# Patient Record
Sex: FEMALE | Race: WHITE | URBAN - METROPOLITAN AREA
[De-identification: names, ages, dates, MRNs, and addresses within clinical notes are randomized per-mention and may not be internally consistent; named-entity substitution may affect disease eponyms.]

---

## 2020-09-28 ENCOUNTER — OFFICE VISIT (OUTPATIENT)
Dept: URBAN - METROPOLITAN AREA CLINIC 128 | Facility: CLINIC | Age: 50
End: 2020-09-28
Payer: COMMERCIAL

## 2020-09-28 DIAGNOSIS — Z12.11 COLON CANCER SCREENING: ICD-10-CM

## 2020-09-28 DIAGNOSIS — R12 HEARTBURN: ICD-10-CM

## 2020-09-28 PROCEDURE — G8427 DOCREV CUR MEDS BY ELIG CLIN: HCPCS | Performed by: INTERNAL MEDICINE

## 2020-09-28 PROCEDURE — 99214 OFFICE O/P EST MOD 30 MIN: CPT | Performed by: INTERNAL MEDICINE

## 2020-09-28 PROCEDURE — 1036F TOBACCO NON-USER: CPT | Performed by: INTERNAL MEDICINE

## 2020-09-28 PROCEDURE — 3017F COLORECTAL CA SCREEN DOC REV: CPT | Performed by: INTERNAL MEDICINE

## 2020-09-28 RX ORDER — FAMOTIDINE 20 MG/1
TABLET ORAL
Qty: 0 | Refills: 0 | Status: ACTIVE | COMMUNITY
Start: 1900-01-01 | End: 1900-01-01

## 2020-09-28 RX ORDER — PROMETHAZINE HYDROCHLORIDE 12.5 MG/1
TABLET ORAL
Qty: 0 | Refills: 0 | Status: ACTIVE | COMMUNITY
Start: 1900-01-01 | End: 1900-01-01

## 2020-09-28 RX ORDER — SUCRALFATE 1 G/1
TABLET ORAL
Qty: 0 | Refills: 0 | Status: ACTIVE | COMMUNITY
Start: 1900-01-01 | End: 1900-01-01

## 2020-09-28 RX ORDER — PANTOPRAZOLE SODIUM 20 MG/1
1 TABLET TABLET, DELAYED RELEASE ORAL ONCE A DAY
Qty: 90 TABLET | Refills: 1 | OUTPATIENT
Start: 2020-09-28

## 2020-09-28 RX ORDER — ONDANSETRON 4 MG/1
TABLET, ORALLY DISINTEGRATING ORAL
Qty: 0 | Refills: 0 | Status: ACTIVE | COMMUNITY
Start: 1900-01-01 | End: 1900-01-01

## 2020-09-28 RX ORDER — PROPRANOLOL HYDROCHLORIDE 60 MG/1
CAPSULE, EXTENDED RELEASE ORAL
Qty: 0 | Refills: 0 | Status: ACTIVE | COMMUNITY
Start: 1900-01-01 | End: 1900-01-01

## 2020-09-28 NOTE — HPI-TODAY'S VISIT:
Mrs. Baca is a 50 year old female who was last seen in GI clinic on 5/15/2020 for heartburn.  At time of last clinic visit we prescribed protonix 20mg daily. She reports doing well up until mid September 2020. She reports having such severe reflux she was afraid to lay down as concern she will aspirate. She reports alcohol and sodas are identified triggers.   Mrs. Baca reports she has not yet had EGD performed.   She reports also being due for a screening colonoscopy.   Prior history is summarized below: -She had onset of reflux since childhood. She was taking famotidine 20mg daily and carafate PRN; she reports being on famotidine for almost 2 years. She reports being on protonix for 3 months; she reports it worked well but her provider did not want her to be on it long term.   -She had anterior cervical disc fusion in 2007 of C5/C6.  -She reports having an EGD in 2012 which she reports being told showed "fire engine red blotches" in her stomach.

## 2020-10-20 ENCOUNTER — OFFICE VISIT (OUTPATIENT)
Dept: URBAN - METROPOLITAN AREA LAB 2 | Facility: LAB | Age: 50
End: 2020-10-20
Payer: COMMERCIAL

## 2020-10-20 DIAGNOSIS — K29.60 ADENOPAPILLOMATOSIS GASTRICA: ICD-10-CM

## 2020-10-20 DIAGNOSIS — K63.5 BENIGN COLON POLYP: ICD-10-CM

## 2020-10-20 DIAGNOSIS — Z12.11 COLON CANCER SCREENING: ICD-10-CM

## 2020-10-20 DIAGNOSIS — K20.80 LOS ANGELES GRADE B ESOPHAGITIS: ICD-10-CM

## 2020-10-20 PROCEDURE — 45385 COLONOSCOPY W/LESION REMOVAL: CPT | Performed by: INTERNAL MEDICINE

## 2020-10-20 PROCEDURE — 45380 COLONOSCOPY AND BIOPSY: CPT | Performed by: INTERNAL MEDICINE

## 2020-10-20 PROCEDURE — G9935 CANC NOT DETECTD DURING SRCN: HCPCS | Performed by: INTERNAL MEDICINE

## 2020-10-20 PROCEDURE — 43239 EGD BIOPSY SINGLE/MULTIPLE: CPT | Performed by: INTERNAL MEDICINE

## 2020-10-20 RX ORDER — SUCRALFATE 1 G/1
TABLET ORAL
Qty: 0 | Refills: 0 | Status: ACTIVE | COMMUNITY
Start: 1900-01-01 | End: 1900-01-01

## 2020-10-20 RX ORDER — PROMETHAZINE HYDROCHLORIDE 12.5 MG/1
TABLET ORAL
Qty: 0 | Refills: 0 | Status: ACTIVE | COMMUNITY
Start: 1900-01-01 | End: 1900-01-01

## 2020-10-20 RX ORDER — FAMOTIDINE 20 MG/1
TABLET ORAL
Qty: 0 | Refills: 0 | Status: ACTIVE | COMMUNITY
Start: 1900-01-01 | End: 1900-01-01

## 2020-10-20 RX ORDER — PROPRANOLOL HYDROCHLORIDE 60 MG/1
CAPSULE, EXTENDED RELEASE ORAL
Qty: 0 | Refills: 0 | Status: ACTIVE | COMMUNITY
Start: 1900-01-01 | End: 1900-01-01

## 2020-10-20 RX ORDER — ONDANSETRON 4 MG/1
TABLET, ORALLY DISINTEGRATING ORAL
Qty: 0 | Refills: 0 | Status: ACTIVE | COMMUNITY
Start: 1900-01-01 | End: 1900-01-01

## 2020-10-20 RX ORDER — PANTOPRAZOLE SODIUM 20 MG/1
1 TABLET TABLET, DELAYED RELEASE ORAL ONCE A DAY
Qty: 90 TABLET | Refills: 1 | Status: ACTIVE | COMMUNITY
Start: 2020-09-28

## 2020-10-27 ENCOUNTER — TELEPHONE ENCOUNTER (OUTPATIENT)
Dept: URBAN - METROPOLITAN AREA CLINIC 92 | Facility: CLINIC | Age: 50
End: 2020-10-27

## 2020-11-19 ENCOUNTER — WEB ENCOUNTER (OUTPATIENT)
Dept: URBAN - METROPOLITAN AREA CLINIC 19 | Facility: CLINIC | Age: 50
End: 2020-11-19

## 2020-11-19 ENCOUNTER — OFFICE VISIT (OUTPATIENT)
Dept: URBAN - METROPOLITAN AREA CLINIC 19 | Facility: CLINIC | Age: 50
End: 2020-11-19
Payer: COMMERCIAL

## 2020-11-19 DIAGNOSIS — R12 HEARTBURN: ICD-10-CM

## 2020-11-19 DIAGNOSIS — Z12.11 COLON CANCER SCREENING: ICD-10-CM

## 2020-11-19 PROCEDURE — 3017F COLORECTAL CA SCREEN DOC REV: CPT | Performed by: INTERNAL MEDICINE

## 2020-11-19 PROCEDURE — 99213 OFFICE O/P EST LOW 20 MIN: CPT | Performed by: INTERNAL MEDICINE

## 2020-11-19 PROCEDURE — 1036F TOBACCO NON-USER: CPT | Performed by: INTERNAL MEDICINE

## 2020-11-19 PROCEDURE — G8482 FLU IMMUNIZE ORDER/ADMIN: HCPCS | Performed by: INTERNAL MEDICINE

## 2020-11-19 RX ORDER — ONDANSETRON 4 MG/1
TABLET, ORALLY DISINTEGRATING ORAL
Qty: 0 | Refills: 0 | Status: ACTIVE | COMMUNITY
Start: 1900-01-01

## 2020-11-19 RX ORDER — FAMOTIDINE 20 MG/1
TABLET ORAL
Qty: 0 | Refills: 0 | Status: ACTIVE | COMMUNITY
Start: 1900-01-01

## 2020-11-19 RX ORDER — PROPRANOLOL HYDROCHLORIDE 60 MG/1
CAPSULE, EXTENDED RELEASE ORAL
Qty: 0 | Refills: 0 | Status: ACTIVE | COMMUNITY
Start: 1900-01-01

## 2020-11-19 RX ORDER — PANTOPRAZOLE SODIUM 20 MG/1
1 TABLET TABLET, DELAYED RELEASE ORAL ONCE A DAY
Qty: 90 TABLET | Refills: 1 | Status: ACTIVE | COMMUNITY
Start: 2020-09-28

## 2020-11-19 RX ORDER — PROMETHAZINE HYDROCHLORIDE 12.5 MG/1
TABLET ORAL
Qty: 0 | Refills: 0 | Status: ACTIVE | COMMUNITY
Start: 1900-01-01

## 2020-11-19 RX ORDER — SUCRALFATE 1 G/1
TABLET ORAL
Qty: 0 | Refills: 0 | Status: ACTIVE | COMMUNITY
Start: 1900-01-01

## 2020-11-19 NOTE — HPI-TODAY'S VISIT:
Mrs. Baca is a 50 year old female who was last seen in GI clinic on 9/28/2020 for heartburn.    On 10/20/2020 she had an EGD and colonoscopy performed. The EGD showed small plaques in the mid esophagus, irregular z-line and mild gastritis. The colonoscopy showed 4 sigmoid colon polyps and a few sigmoid colon diverticulosis.   Pathology from mid esophagus showed focal inflammation but was negative for eosinophilic esophagitis. Pathology from lower third of the esophagus were unremarkable. GE junction biopsies showed focal esophagitis but were negative for Rao's esophagus. Gastric biopsies were negative for H. pylori. Sigmoid colon biopsies were consistent with hyperplastic polyps.   She reports protonix 20mg daily seems to treat reflux with exception when she eats known food triggers.   Repeat colonoscopy is recommended in 10 year.   Prior history is summarized below: -She had onset of reflux since childhood. She was taking famotidine 20mg daily and carafate PRN; she reports being on famotidine for almost 2 years. She reports being on protonix for 3 months; she reports it worked well but her provider did not want her to be on it long term.   -She had anterior cervical disc fusion in 2007 of C5/C6.  -She reports having an EGD in 2012 which she reports being told showed "fire engine red blotches" in her stomach. -On 5/15/2020 we prescribed protonix 20mg daily. She reports doing well up until mid September 2020. She reports having such severe reflux she was afraid to lay down as concern she will aspirate. She reports alcohol and sodas are identified triggers.

## 2023-11-29 ENCOUNTER — OFFICE VISIT (OUTPATIENT)
Dept: URBAN - METROPOLITAN AREA CLINIC 98 | Facility: CLINIC | Age: 53
End: 2023-11-29
Payer: COMMERCIAL

## 2023-11-29 ENCOUNTER — LAB OUTSIDE AN ENCOUNTER (OUTPATIENT)
Dept: URBAN - METROPOLITAN AREA CLINIC 98 | Facility: CLINIC | Age: 53
End: 2023-11-29

## 2023-11-29 VITALS
BODY MASS INDEX: 29.68 KG/M2 | TEMPERATURE: 97.8 F | HEIGHT: 59 IN | DIASTOLIC BLOOD PRESSURE: 78 MMHG | HEART RATE: 93 BPM | SYSTOLIC BLOOD PRESSURE: 115 MMHG | WEIGHT: 147.2 LBS

## 2023-11-29 DIAGNOSIS — K57.92 DIVERTICULITIS: ICD-10-CM

## 2023-11-29 DIAGNOSIS — K76.0 FATTY LIVER: ICD-10-CM

## 2023-11-29 DIAGNOSIS — K59.01 CONSTIPATION: ICD-10-CM

## 2023-11-29 PROBLEM — 197321007: Status: ACTIVE | Noted: 2023-11-29

## 2023-11-29 PROBLEM — 307496006: Status: ACTIVE | Noted: 2023-11-29

## 2023-11-29 PROCEDURE — 99204 OFFICE O/P NEW MOD 45 MIN: CPT

## 2023-11-29 RX ORDER — SODIUM, POTASSIUM,MAG SULFATES 17.5-3.13G
354ML SOLUTION, RECONSTITUTED, ORAL ORAL
Qty: 354 ML | Refills: 0 | OUTPATIENT
Start: 2023-11-29 | End: 2023-11-30

## 2023-11-29 RX ORDER — PROPRANOLOL HYDROCHLORIDE 60 MG/1
CAPSULE, EXTENDED RELEASE ORAL
Qty: 0 | Refills: 0 | Status: ACTIVE | COMMUNITY
Start: 1900-01-01

## 2023-11-29 RX ORDER — PANTOPRAZOLE SODIUM 20 MG/1
1 TABLET TABLET, DELAYED RELEASE ORAL ONCE A DAY
Qty: 90 TABLET | Refills: 1 | Status: ACTIVE | COMMUNITY
Start: 2020-09-28

## 2023-11-29 RX ORDER — ONDANSETRON 4 MG/1
TABLET, ORALLY DISINTEGRATING ORAL
Qty: 0 | Refills: 0 | Status: ACTIVE | COMMUNITY
Start: 1900-01-01

## 2023-11-29 NOTE — PHYSICAL EXAM GASTROINTESTINAL
Abdomen , soft, nontender, nondistended , no guarding or rigidity , no masses palpable ,  Liver and Spleen,  no hepatosplenomegaly , liver nontender

## 2023-11-29 NOTE — HPI-OTHER HISTORIES
CT ABDOMEN PELVIS W IV CONTRAST 11/10/2023 CLINICAL INDICATION: LLQ pain. TECHNIQUE: Following administration of non-ionic IV contrast, postcontrast images through the abdomen and pelvis were obtained. [id number] If applicable, point-of-care testing?was approved following departmental protocol. COMPARISON: None FINDINGS: Lower Thorax: Normal. Liver: Fatty infiltration of the liver with hepatomegaly. Gallbladder/Biliary Tree: Prior cholecystectomy. Spleen: Normal. Pancreas: Normal. Adrenal Glands: Normal. Kidneys/Ureters: Normal. Gastrointestinal: Wall thickening versus mucosal enhancement involving the rectum as well as the entire colon. Normal appendix. More prominent wall thickening along the transverse colon and descending colon. Small bowel loops are normal in caliber. Possible mild wall thickening of the terminal ileum. No gastric wall thickening.  Bladder: Normal. Uterus/Adnexa: Atrophic uterus. Lymph Nodes: Normal. Vessels: No aortic aneurysm. Celiac axis and SMA are patent. Splenic vein, portal vein and SMV are patent. Peritoneum/Retroperitoneum: No free fluid. Small amount of pelvic free fluid is present. No abdominal free fluid. Omentum is normal. Mesentery is normal. Bones/Soft Tissues: Ventral midline supraumbilical fat-containing hernia. No acute fracture.  IMPRESSION: 1. Findings consistent with a moderate proctocolitis. Possible mild wall thickening of the terminal ileum as well. Correlate with patient's symptoms.2. Fatty liver with hepatomegaly. Prior cholecystectomy.

## 2023-11-29 NOTE — HPI-TODAY'S VISIT:
Ms. Baca is a 53-year-old female new patient here to follow-up after diverticulitis. PCP Dr. Lo Llanos; progress note will be sent to office following visit. - Previous patient of Dr. Job Chapman in 2020 - Diverticulitis ER ESTJ: 11/10/23 - Prescribed antibiotics amoxicillin finished on 11/20/23 - Feeling much better - Previously had diverticulitis 2 years ago. - BM every 2-3 days - Stools are formed, not hard or soft - Does not feel completed evacuated after BM - Worse since starting Mounjaro in 4/2023 - Takes magnesium and stool softener to help with BM - No bright red blood, melena, or mucus in stool - Heartburn and reflux controlled with pantoprazole every morning - Fatty liver with hepatomegaly noted on CT scan

## 2023-11-30 LAB
IMMUNOGLOBULIN A: 174
IMMUNOGLOBULIN G: 844
IMMUNOGLOBULIN M: 101

## 2023-12-04 LAB
A/G RATIO: 1.8
ABSOLUTE BASOPHILS: 34
ABSOLUTE EOSINOPHILS: 251
ABSOLUTE LYMPHOCYTES: 2565
ABSOLUTE MONOCYTES: 809
ABSOLUTE NEUTROPHILS: 7741
ACTIN (SMOOTH MUSCLE) ANTIBODY (IGG): <20
AFP, SERUM, TUMOR MARKER: 14.3
ALBUMIN: 4.6
ALKALINE PHOSPHATASE: 112
ALT (SGPT): 28
ANA SCREEN, IFA: POSITIVE
AST (SGOT): 20
BASOPHILS: 0.3
BILIRUBIN, TOTAL: 0.3
BUN/CREATININE RATIO: (no result)
BUN: 13
CALCIUM: 10.2
CARBON DIOXIDE, TOTAL: 27
CHLORIDE: 102
CREATININE: 0.61
EGFR: 107
EOSINOPHILS: 2.2
GGT: 25
GLOBULIN, TOTAL: 2.5
GLUCOSE: 90
HEMATOCRIT: 45.3
HEMOGLOBIN: 14.8
HEPATITIS A AB, TOTAL: (no result)
HEPATITIS B SURFACE AB IMMUNITY, QN: <5
HEPATITIS B SURFACE ANTIGEN: (no result)
HEPATITIS C ANTIBODY: (no result)
LYMPHOCYTES: 22.5
MCH: 30.6
MCHC: 32.7
MCV: 93.6
MITOCHONDRIAL (M2) ANTIBODY: <=20
MONOCYTES: 7.1
MPV: 9.7
NEUTROPHILS: 67.9
PLATELET COUNT: 403
POTASSIUM: 4.8
PROTEIN, TOTAL: 7.1
RDW: 12.5
RED BLOOD CELL COUNT: 4.84
SODIUM: 140
WHITE BLOOD CELL COUNT: 11.4

## 2023-12-06 PROBLEM — 14760008: Status: ACTIVE | Noted: 2023-12-06

## 2023-12-08 ENCOUNTER — TELEPHONE ENCOUNTER (OUTPATIENT)
Dept: URBAN - METROPOLITAN AREA CLINIC 98 | Facility: CLINIC | Age: 53
End: 2023-12-08

## 2023-12-11 ENCOUNTER — LAB OUTSIDE AN ENCOUNTER (OUTPATIENT)
Dept: URBAN - METROPOLITAN AREA CLINIC 98 | Facility: CLINIC | Age: 53
End: 2023-12-11

## 2023-12-18 ENCOUNTER — TELEPHONE ENCOUNTER (OUTPATIENT)
Dept: URBAN - METROPOLITAN AREA CLINIC 98 | Facility: CLINIC | Age: 53
End: 2023-12-18

## 2023-12-22 ENCOUNTER — WEB ENCOUNTER (OUTPATIENT)
Dept: URBAN - METROPOLITAN AREA CLINIC 98 | Facility: CLINIC | Age: 53
End: 2023-12-22

## 2024-01-17 ENCOUNTER — OFFICE VISIT (OUTPATIENT)
Dept: URBAN - METROPOLITAN AREA SURGERY CENTER 18 | Facility: SURGERY CENTER | Age: 54
End: 2024-01-17

## 2024-01-28 ENCOUNTER — DASHBOARD ENCOUNTERS (OUTPATIENT)
Age: 54
End: 2024-01-28

## 2024-01-29 ENCOUNTER — OFFICE VISIT (OUTPATIENT)
Dept: URBAN - METROPOLITAN AREA CLINIC 98 | Facility: CLINIC | Age: 54
End: 2024-01-29

## 2024-02-21 ENCOUNTER — OV EP (OUTPATIENT)
Dept: URBAN - METROPOLITAN AREA SURGERY CENTER 18 | Facility: SURGERY CENTER | Age: 54
End: 2024-02-21

## 2024-05-09 ENCOUNTER — TELEPHONE ENCOUNTER (OUTPATIENT)
Dept: URBAN - METROPOLITAN AREA CLINIC 80 | Facility: CLINIC | Age: 54
End: 2024-05-09